# Patient Record
Sex: FEMALE | Race: WHITE
[De-identification: names, ages, dates, MRNs, and addresses within clinical notes are randomized per-mention and may not be internally consistent; named-entity substitution may affect disease eponyms.]

---

## 2023-08-13 ENCOUNTER — HOSPITAL ENCOUNTER (EMERGENCY)
Dept: HOSPITAL 47 - EC | Age: 33
LOS: 1 days | Discharge: HOME | End: 2023-08-14
Payer: COMMERCIAL

## 2023-08-13 VITALS — RESPIRATION RATE: 18 BRPM | TEMPERATURE: 98.3 F
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
## 2023-08-13 DIAGNOSIS — F14.90: ICD-10-CM

## 2023-08-13 DIAGNOSIS — I25.2: ICD-10-CM

## 2023-08-13 DIAGNOSIS — Z88.0: ICD-10-CM

## 2023-08-13 DIAGNOSIS — F17.290: ICD-10-CM

## 2023-08-13 DIAGNOSIS — Z88.8: ICD-10-CM

## 2023-08-13 DIAGNOSIS — M79.672: Primary | ICD-10-CM

## 2023-08-13 PROCEDURE — 99284 EMERGENCY DEPT VISIT MOD MDM: CPT

## 2023-08-13 NOTE — ED
Lower Extremity Injury HPI





- General


Chief Complaint: Extremity Injury, Lower


Stated Complaint: ankle pain


Time Seen by Provider: 08/13/23 22:59


Source: patient, EMS, RN notes reviewed


Mode of arrival: EMS


Limitations: no limitations





- History of Present Illness


Initial Comments: 


Patient is a 33-year-old  female presenting to the emergency room via 

EMS from [facility] for further evaluation of left foot pain in which she has 

been in a brace for for some time after causing significant nerve damage after 

laying on the foot for a prolonged period of time approximately one month ago. 

After that injury she did spend some time in a nursing home is currently in 

rehab for substance abuse at [facility]. She reports rolling her ankle 

multiple times today and having pain in the ankle despite utilizing her. In 

addition to her substance abuse history and history regarding her left foot as 

indicated above she has a past medical history significant for cocaine-induced 

myocardial infarction.





- Related Data


                                    Allergies











Allergy/AdvReac Type Severity Reaction Status Date / Time


 


azithromycin [From Zithromax] Allergy  Anaphylaxis Verified 08/13/23 21:42


 


Penicillins Allergy  Anaphylaxis Verified 08/13/23 21:42


 


quetiapine [From Seroquel] AdvReac  Unknown Verified 08/13/23 21:42














Review of Systems


ROS Statement: 


Those systems with pertinent positive or pertinent negative responses have been 

documented in the HPI.





ROS Other: All systems not noted in ROS Statement are negative.





Past Medical History


Past Medical History: Myocardial Infarction (MI)


History of Any Multi-Drug Resistant Organisms: MRSA


Date of last positivie culture/infection: 2019


MDRO Source:: Finger


Past Surgical History: Unable to Obtain


Past Psychological History: Bipolar


Smoking Status: Current every day smoker, Vaper


Past Alcohol Use History: Occasional


Past Drug Use History: Cocaine, IV Drug Use





General Exam


Limitations: no limitations


General appearance: alert, in no apparent distress


Head exam: Present: atraumatic, normocephalic, normal inspection


Eye exam: Present: normal appearance, PERRL, EOMI.  Absent: scleral icterus, 

conjunctival injection, periorbital swelling


ENT exam: Present: normal exam, mucous membranes moist


Neck exam: Present: normal inspection


Respiratory exam: Absent: respiratory distress, accessory muscle use


Cardiovascular Exam: Present: regular rate


  ** Left


Foot/Toe exam: Present: tenderness, swelling.  Absent: full ROM (Left foot 

drop), abrasion, laceration, ecchymosis, deformity, crepitus, dislocation, 

erythema, amputation, puncture wound, foreign body, calcaneal tenderness, 

tenderness at base of 5th metatarsal, nail avulsion, subungual hematoma


Neurovascular tendon exam: Present: no vascular compromise


Gait: not tested/not observed


Back exam: Present: normal inspection


Neurological exam: Present: alert, oriented X3, CN II-XII intact


Psychiatric exam: Present: normal affect, normal mood


Skin exam: Present: warm, dry, intact, normal color.  Absent: rash





Course





                                   Vital Signs











  08/13/23





  21:39


 


Temperature 98.3 F


 


Pulse Rate 94


 


Respiratory 18





Rate 


 


Blood Pressure 120/75


 


O2 Sat by Pulse 98





Oximetry 














Medical Decision Making





- Medical Decision Making


Was pt. sent in by a medical professional or institution (, PA, NP, urgent 

care, hospital, or nursing home...) When possible be specific


@  -Yes, sent from [facility]


Did you speak to anyone other than the patient for history (EMS, parent, family,

police, friend...)? What history was obtained from this source 


@  -No


Did you review nursing and triage notes (agree or disagree)?  Why? 


@  -I reviewed and agree with nursing and triage notes


Were old charts reviewed (outside hosp., previous admission, EMS record, old 

EKG, old radiological studies, urgent care reports/EKG's, nursing home records)?

Report findings 


@  -No old charts were reviewed


Differential Diagnosis (chest pain, altered mental status, abdominal pain women,

abdominal pain men, vaginal bleeding, weakness, fever, dyspnea, syncope, 

headache, dizziness, GI bleed, back pain, seizure, CVA, palpatations, mental 

health, musculoskeletal)? 


@  -Differential Musculoskeletal


Muscular strain, contusion, ligament sprain, fracture, arthritis, septic 

arthritis, bursitis, cellulitis, muscle spasm, nerve compression, DVT, arterial 

occlusion, herpes zoster, electrolyte abnormality, tumor.... This is not meant 

to be in all inclusive list


EKG interpreted by me (3pts min.).


@  -None done


X-rays interpreted by me (1pt min.).


@  -X-ray left foot: No acute fracture or dislocation.


CT interpreted by me (1pt min.).


@  -None done


U/S interpreted by me (1pt. min.).


@  -None done


What testing was considered but not performed or refused? (CT, X-rays, U/S, 

labs)? Why?


@  -None


What meds were considered but not given or refused? Why?


@  -None


Did you discuss the management of the patient with other professionals 

(professionals i.e. , PA, NP, lab, RT, psych nurse, , , 

teacher, , )? Give summary


@  -No


Was smoking cessation discussed for >3mins.?


@  -No


Was critical care preformed (if so, how long)?


@  -No


Were there social determinants of health that impacted care today? How? 

(Homelessness, low income, unemployed, alcoholism, drug addiction, 

transportation, low edu. Level, literacy, decrease access to med. care, senior living, 

rehab)?


@  -No


Was there de-escalation of care discussed even if they declined (Discuss DNR or 

withdrawal of care, Hospice)? DNR status


@  -No


What co-morbidities impacted this encounter? (DM, HTN, Smoking, COPD, CAD, 

Cancer, CVA, ARF, Chemo, Hep., AIDS, mental health diagnosis, sleep apnea, 

morbid obesity)?


@  -None


Was patient admitted / discharged? Hospital course, mention meds given and 

route, prescriptions, significant lab abnormalities, going to OR and other 

pertinent info.


@  -33-year-old  female presenting to the emergency room via EMS from 

[facility] for further evaluation of left foot pain in which she has been in a

brace for for some time after causing significant nerve damage after laying on 

the foot for a prolonged period of time approximately one month ago. X-ray of 

the left foot obtained by triaging provider which demonstrates no acute osseous 

pathology. No indication for any further diagnostic imaging or laboratory 

studies. Patient requesting a new AV obese however advised that those are not 

available here in the emergency room and that she needs to follow-up with her 

outpatient orthopedic specialist for a new brace. Questions and concerns 

answered. Return parameters emergency room discussed.





Will discharge home/back to [facility] rehabilitation services with continued 

follow-up with her orthopedic specialist in regards to her chronic left foot 

pain. 


Undiagnosed new problem with uncertain prognosis?


@  -No


Drug Therapy requiring intensive monitoring for toxicity (Heparin, Nitro, 

Insulin, Cardizem)?


@  -No


Were any procedures done?


@  -No


Diagnosis/symptom?


@  -Left foot pain


Acute, or Chronic, or Acute on Chronic?


@  -Acute on chronic


Uncomplicated (without systemic symptoms) or Complicated (systemic symptoms)?


@  -Uncomplicated


Side effects of treatment?


@  -No


Exacerbation, Progression, or Severe Exacerbation?


@  -No


Poses a threat to life or bodily function? How? (Chest pain, USA, MI, pneumonia,

PE, COPD, DKA, ARF, appy, cholecystitis, CVA, Diverticulitis, Homicidal, 

Suicidal, threat to staff... and all critical care pts)


@  -No


Case discussed with Dr. Hernández.





- Radiology Data


Radiology results: image reviewed





Disposition


Clinical Impression: 


 Left foot pain





Disposition: HOME SELF-CARE


Condition: Stable


Instructions (If sedation given, give patient instructions):  Metatarsalgia (DC)


Additional Instructions: 


Please continue to utilize her brace for your left foot. Please follow-up with 

your orthopedic specialist for a new brace as needed. Please return to the 

Emergency Department if symptoms worsen or any other concerns.


Is patient prescribed a controlled substance at d/c from ED?: No


Referrals: 


None,Stated [Primary Care Provider] - 1-2 days


Time of Disposition: 23:23

## 2023-08-14 VITALS — DIASTOLIC BLOOD PRESSURE: 82 MMHG | SYSTOLIC BLOOD PRESSURE: 121 MMHG | HEART RATE: 79 BPM

## 2023-08-14 NOTE — XR
EXAM:

  XR Left Foot Complete, 3 or More Views

 

CLINICAL HISTORY:

  ITS.REASON XR Reason: pain

 

TECHNIQUE:

  Frontal, lateral and oblique views of the left foot.

 

COMPARISON:

  No relevant prior studies available.

 

FINDINGS:

  Bones/joints:  No acute fracture or dislocation.

  Soft tissues:  Unremarkable.  No radiopaque foreign body.

 

IMPRESSION:     

1.  No acute osseous findings.

2.  Hallux valgus is suggested, but this would be better evaluated on 

weightbearing views.